# Patient Record
Sex: FEMALE | Race: WHITE | NOT HISPANIC OR LATINO
[De-identification: names, ages, dates, MRNs, and addresses within clinical notes are randomized per-mention and may not be internally consistent; named-entity substitution may affect disease eponyms.]

---

## 2017-04-25 ENCOUNTER — TRANSCRIPTION ENCOUNTER (OUTPATIENT)
Age: 57
End: 2017-04-25

## 2019-02-04 ENCOUNTER — APPOINTMENT (OUTPATIENT)
Dept: ORTHOPEDIC SURGERY | Facility: CLINIC | Age: 59
End: 2019-02-04
Payer: MEDICARE

## 2019-02-04 VITALS
SYSTOLIC BLOOD PRESSURE: 107 MMHG | DIASTOLIC BLOOD PRESSURE: 65 MMHG | BODY MASS INDEX: 23.32 KG/M2 | TEMPERATURE: 98.5 F | WEIGHT: 140 LBS | HEART RATE: 80 BPM | HEIGHT: 65 IN

## 2019-02-04 DIAGNOSIS — Z80.3 FAMILY HISTORY OF MALIGNANT NEOPLASM OF BREAST: ICD-10-CM

## 2019-02-04 DIAGNOSIS — Z80.8 FAMILY HISTORY OF MALIGNANT NEOPLASM OF OTHER ORGANS OR SYSTEMS: ICD-10-CM

## 2019-02-04 DIAGNOSIS — M17.12 UNILATERAL PRIMARY OSTEOARTHRITIS, LEFT KNEE: ICD-10-CM

## 2019-02-04 PROCEDURE — 99204 OFFICE O/P NEW MOD 45 MIN: CPT

## 2019-02-04 PROCEDURE — 73562 X-RAY EXAM OF KNEE 3: CPT | Mod: LT

## 2019-02-04 NOTE — REASON FOR VISIT
[Initial Visit] : an initial visit for [Knee Pain] : knee pain [Spouse] : spouse [FreeTextEntry2] : Left knee pain

## 2019-02-04 NOTE — HISTORY OF PRESENT ILLNESS
[Pain Location] : pain [Worsening] : worsening [2] : a current pain level of 2/10 [___ yrs] : [unfilled] year(s) ago [Intermit.] : ~He/She~ states the symptoms seem to be intermittent [Bending] : worsened by bending [Walking] : worsened by walking [Heat] : relieved by heat [Recumbency] : relieved by recumbency [Rest] : relieved by rest [de-identified] : 59 year old female presents with left knee pain since she was 20 years old. She was referred by Dr. Whittington and tried injections with him. The pain is a dull, cramping pain. Rest and heat compresses alleviate the pain. Walking and bending aggravate the pain. She exercises 2 times a week with treadmill. When she takes the stairs, she favors her left knee. She notes dislocation of patella when she extends her knee. She can no longer extend her knee actiely, but her extensor mechanism is intact

## 2019-02-04 NOTE — ADDENDUM
[FreeTextEntry1] : I, Katerina Rivera, acted solely as a scribe for Dr. Herb Xavier on this date 02/04/2019.

## 2019-02-04 NOTE — DISCUSSION/SUMMARY
[de-identified] : 59 year old female presents with bone on bone patellofemoral compartment osteoarthritis, severe lateral subluxation of patella, and dysplastic trochlea of the left knee. We discussed the nature of the condition and the treatment options. Based on the x-ray and physical exam, the patient is a candidate for a left patellofemoral replacement or left total knee replacement. The patient will schedule a left total knee replacement today. The patient will follow up after the procedure.\par \par A knee replacement means resurfacing of all 3 surfaces of the patella, the femur, and tibia with metal and plastic parts. The prosthetic parts are usually cemented into position and well outpatient range of motion from full extension to about 120° of flexion. The postoperative motion, however, is determined by multiple factors, the most important of which is preoperative motion. In general, the better motion preoperatively, the better the motion postoperatively. The operation, pending medical clearance, gently requires hospitalization of 3-4 days for one knee, 5-7 days for bilateral knee replacements. In general, we prefer to perform the procedure under spinal anesthesia with femoral nerve block and occasional single shot sciatic nerve block. We may ask a patient to give 2 units of blood for bilateral total knee arthroplasties, for one knee we institute a normogram which may include administration of preoperative Procrit. The operative procedure takes probably 1-2 hours. The operation requires a straight incision anywhere from 5-7 inches down from the knee. Postoperatively, the patient is positioned in a continuous passive motion machine within the first 24 hours and is walking the day after surgery. The first couple days are very painful and the pain medication will alleviate, but not eliminate the pain. The patient must really push hard to get range of motion. Our goal for having a person go home as that the range of motion is approximately 0-90° of flexion and that they can walk with a walker or cane. A walking aid is to be dispensed once the patient is secure enough. In general there, there is no cast or brace required with routine knee replacement. In the long term, we do not encourage our patients to run for the sake of running, although pending their preoperative status, we often allow patient to play doubles tennis or comparative activities. We also allowed them to do gentle intermediate downhill skiing if they are truly an expert skier. Biking is encouraged as well swimming. The followup periods are usually 3 weeks, 6 weeks, 3 months, and yearly intervals. Potential complications with total knee replacement included anesthetic complications and death, infection around 1%, nerve damage, by which means peroneal nerve palsy, footdrop or flapping foot with ambulation. This is particularly more apt to occur in the patient with a valgus or knock-knee deformity. The incidence can be quite high in this particular patient population. There will be areas of skin numbness, but this is not an untoward effect nor do we consider it a complication. Other potential complications include dislocation of the patella component, usually less than 2%; loosening of the tibial or femoral component is much more infrequent. Most often this occurs with infection or long-term use. Patient of extreme risk including markedly overweight patient's may be more prone to prosthetic wear. Major blood vessel damage is also extremely rare. Directly because of the anatomic proximity of the popliteal artery this could be lacerated with subsequent repair required. Be it unlikely, disruption of the popliteal artery could theoretically result in amputation. Similarly, infection could theoretically result in amputation if one were to grow out of an organism that cannot be controlled with antibiotics. General medical complications include phlebitis, for which we would prophylactically anticoagulate patients, but could still occur, and fatal pulmonary embolus which has been reported. Cardiovascular problems, such as heart attack or ischemia are always a concern with such hemodynamic changes in the blood vascular system. Other General complications are rare, but anything medicine could theoretically happen. I think the patient understands the risk benefit ratio of total knee replacement and will think about whether there like to pursue with an operation or nonoperative treatment program.

## 2019-02-04 NOTE — PHYSICAL EXAM
[LE] : Sensory: Intact in bilateral lower extremities [ALL] : dorsalis pedis, posterior tibial, femoral, popliteal, and radial 2+ and symmetric bilaterally [Normal] : Oriented to person, place, and time, insight and judgement were intact and the affect was normal [Poor Appearance] : well-appearing [Acute Distress] : not in acute distress [Obese] : not obese [de-identified] : GENERAL APPEARANCE: Well nourished and hydrated, pleasant, alert, and oriented x 3. Appears their stated age. \par HEENT: Normocephalic, extraocular eye motion intact. Nasal septum midline. Oral cavity clear. External auditory canal clear. \par RESPIRATORY: Breath sounds clear and audible in all lobes. No wheezing, No accessory muscle use.\par CARDIOVASCULAR: No apparent abnormalities. No lower leg edema. No varicosities. Pedal pulses are palpable.\par NEUROLOGIC: Sensation is normal, no muscle weakness in the upper or lower extremities.\par DERMATOLOGIC: No apparent skin lesions, moist, warm, no rash.\par SPINE: Cervical spine appears normal and moves freely; thoracic spine appears normal and moves freely; lumbosacral spine appears normal and moves freely, normal, nontender.\par MUSCULOSKELETAL: Hands, wrists, and elbows are normal and move freely, shoulders are normal and move freely. [de-identified] : Left knee examination demonstrates range of motion preserved, subluxation of patella with full extension [de-identified] : 3V x-ray of the left knee done in office today and reviewed by Dr. Herb Xavier demonstrates bone on bone patellofemoral os severe lateral subluxation of patella and dysplastic trochlea.\par

## 2019-02-20 ENCOUNTER — OTHER (OUTPATIENT)
Age: 59
End: 2019-02-20

## 2019-02-20 RX ORDER — NAPROXEN 500 MG/1
500 TABLET ORAL
Qty: 28 | Refills: 0 | Status: ACTIVE | COMMUNITY
Start: 2019-02-20 | End: 1900-01-01

## 2019-05-01 ENCOUNTER — OUTPATIENT (OUTPATIENT)
Dept: OUTPATIENT SERVICES | Facility: HOSPITAL | Age: 59
LOS: 1 days | End: 2019-05-01
Payer: MEDICARE

## 2019-05-01 VITALS
RESPIRATION RATE: 16 BRPM | HEART RATE: 69 BPM | TEMPERATURE: 97 F | SYSTOLIC BLOOD PRESSURE: 124 MMHG | WEIGHT: 150.13 LBS | HEIGHT: 65 IN | DIASTOLIC BLOOD PRESSURE: 76 MMHG

## 2019-05-01 DIAGNOSIS — Z01.818 ENCOUNTER FOR OTHER PREPROCEDURAL EXAMINATION: ICD-10-CM

## 2019-05-01 DIAGNOSIS — Z90.49 ACQUIRED ABSENCE OF OTHER SPECIFIED PARTS OF DIGESTIVE TRACT: Chronic | ICD-10-CM

## 2019-05-01 DIAGNOSIS — R94.31 ABNORMAL ELECTROCARDIOGRAM [ECG] [EKG]: ICD-10-CM

## 2019-05-01 DIAGNOSIS — Z98.890 OTHER SPECIFIED POSTPROCEDURAL STATES: Chronic | ICD-10-CM

## 2019-05-01 DIAGNOSIS — M19.90 UNSPECIFIED OSTEOARTHRITIS, UNSPECIFIED SITE: ICD-10-CM

## 2019-05-01 DIAGNOSIS — Z29.9 ENCOUNTER FOR PROPHYLACTIC MEASURES, UNSPECIFIED: ICD-10-CM

## 2019-05-01 DIAGNOSIS — Z90.711 ACQUIRED ABSENCE OF UTERUS WITH REMAINING CERVICAL STUMP: Chronic | ICD-10-CM

## 2019-05-01 DIAGNOSIS — M17.12 UNILATERAL PRIMARY OSTEOARTHRITIS, LEFT KNEE: ICD-10-CM

## 2019-05-01 LAB
ANION GAP SERPL CALC-SCNC: 12 MMOL/L — SIGNIFICANT CHANGE UP (ref 5–17)
APTT BLD: 34.3 SEC — SIGNIFICANT CHANGE UP (ref 27.5–36.3)
BASOPHILS # BLD AUTO: 0 K/UL — SIGNIFICANT CHANGE UP (ref 0–0.2)
BASOPHILS NFR BLD AUTO: 0.5 % — SIGNIFICANT CHANGE UP (ref 0–2)
BLD GP AB SCN SERPL QL: SIGNIFICANT CHANGE UP
BUN SERPL-MCNC: 15 MG/DL — SIGNIFICANT CHANGE UP (ref 8–20)
CALCIUM SERPL-MCNC: 9.2 MG/DL — SIGNIFICANT CHANGE UP (ref 8.6–10.2)
CHLORIDE SERPL-SCNC: 103 MMOL/L — SIGNIFICANT CHANGE UP (ref 98–107)
CO2 SERPL-SCNC: 23 MMOL/L — SIGNIFICANT CHANGE UP (ref 22–29)
CREAT SERPL-MCNC: 0.49 MG/DL — LOW (ref 0.5–1.3)
EOSINOPHIL # BLD AUTO: 0.4 K/UL — SIGNIFICANT CHANGE UP (ref 0–0.5)
EOSINOPHIL NFR BLD AUTO: 6.1 % — HIGH (ref 0–6)
GLUCOSE SERPL-MCNC: 97 MG/DL — SIGNIFICANT CHANGE UP (ref 70–115)
HBA1C BLD-MCNC: 5.1 % — SIGNIFICANT CHANGE UP (ref 4–5.6)
HCT VFR BLD CALC: 36.7 % — LOW (ref 37–47)
HGB BLD-MCNC: 12.1 G/DL — SIGNIFICANT CHANGE UP (ref 12–16)
INR BLD: 0.97 RATIO — SIGNIFICANT CHANGE UP (ref 0.88–1.16)
LYMPHOCYTES # BLD AUTO: 1.8 K/UL — SIGNIFICANT CHANGE UP (ref 1–4.8)
LYMPHOCYTES # BLD AUTO: 29.7 % — SIGNIFICANT CHANGE UP (ref 20–55)
MCHC RBC-ENTMCNC: 28.7 PG — SIGNIFICANT CHANGE UP (ref 27–31)
MCHC RBC-ENTMCNC: 33 G/DL — SIGNIFICANT CHANGE UP (ref 32–36)
MCV RBC AUTO: 87.2 FL — SIGNIFICANT CHANGE UP (ref 81–99)
MONOCYTES # BLD AUTO: 0.4 K/UL — SIGNIFICANT CHANGE UP (ref 0–0.8)
MONOCYTES NFR BLD AUTO: 7.1 % — SIGNIFICANT CHANGE UP (ref 3–10)
MRSA PCR RESULT.: SIGNIFICANT CHANGE UP
NEUTROPHILS # BLD AUTO: 3.3 K/UL — SIGNIFICANT CHANGE UP (ref 1.8–8)
NEUTROPHILS NFR BLD AUTO: 56.1 % — SIGNIFICANT CHANGE UP (ref 37–73)
PLATELET # BLD AUTO: 251 K/UL — SIGNIFICANT CHANGE UP (ref 150–400)
POTASSIUM SERPL-MCNC: 4.1 MMOL/L — SIGNIFICANT CHANGE UP (ref 3.5–5.3)
POTASSIUM SERPL-SCNC: 4.1 MMOL/L — SIGNIFICANT CHANGE UP (ref 3.5–5.3)
PROTHROM AB SERPL-ACNC: 11.2 SEC — SIGNIFICANT CHANGE UP (ref 10–12.9)
RBC # BLD: 4.21 M/UL — LOW (ref 4.4–5.2)
RBC # FLD: 13.1 % — SIGNIFICANT CHANGE UP (ref 11–15.6)
S AUREUS DNA NOSE QL NAA+PROBE: SIGNIFICANT CHANGE UP
SODIUM SERPL-SCNC: 138 MMOL/L — SIGNIFICANT CHANGE UP (ref 135–145)
TYPE + AB SCN PNL BLD: SIGNIFICANT CHANGE UP
WBC # BLD: 5.9 K/UL — SIGNIFICANT CHANGE UP (ref 4.8–10.8)
WBC # FLD AUTO: 5.9 K/UL — SIGNIFICANT CHANGE UP (ref 4.8–10.8)

## 2019-05-01 PROCEDURE — 36415 COLL VENOUS BLD VENIPUNCTURE: CPT

## 2019-05-01 PROCEDURE — 93010 ELECTROCARDIOGRAM REPORT: CPT

## 2019-05-01 PROCEDURE — 93005 ELECTROCARDIOGRAM TRACING: CPT

## 2019-05-01 PROCEDURE — 85027 COMPLETE CBC AUTOMATED: CPT

## 2019-05-01 PROCEDURE — 85730 THROMBOPLASTIN TIME PARTIAL: CPT

## 2019-05-01 PROCEDURE — 87640 STAPH A DNA AMP PROBE: CPT

## 2019-05-01 PROCEDURE — 86850 RBC ANTIBODY SCREEN: CPT

## 2019-05-01 PROCEDURE — G0463: CPT

## 2019-05-01 PROCEDURE — 83036 HEMOGLOBIN GLYCOSYLATED A1C: CPT

## 2019-05-01 PROCEDURE — 86900 BLOOD TYPING SEROLOGIC ABO: CPT

## 2019-05-01 PROCEDURE — 87641 MR-STAPH DNA AMP PROBE: CPT

## 2019-05-01 PROCEDURE — 85610 PROTHROMBIN TIME: CPT

## 2019-05-01 PROCEDURE — 80048 BASIC METABOLIC PNL TOTAL CA: CPT

## 2019-05-01 PROCEDURE — 86901 BLOOD TYPING SEROLOGIC RH(D): CPT

## 2019-05-01 RX ORDER — SODIUM CHLORIDE 9 MG/ML
3 INJECTION INTRAMUSCULAR; INTRAVENOUS; SUBCUTANEOUS EVERY 8 HOURS
Refills: 0 | Status: DISCONTINUED | OUTPATIENT
Start: 2019-05-21 | End: 2019-05-23

## 2019-05-01 NOTE — H&P PST ADULT - NSICDXPASTMEDICALHX_GEN_ALL_CORE_FT
PAST MEDICAL HISTORY:  Anemia iron def    Chronic knee pain left  since 20 yr old injured    DDD (degenerative disc disease), cervical bulge disc neck    Heart murmur

## 2019-05-01 NOTE — H&P PST ADULT - NSICDXFAMILYHX_GEN_ALL_CORE_FT
FAMILY HISTORY:  Family history of bone cancer, father  FH: breast cancer, mother  FH: diabetes mellitus, mother

## 2019-05-01 NOTE — H&P PST ADULT - HISTORY OF PRESENT ILLNESS
59 year old female presents with c/o left knee pain since 20 yr old s/p fracture . Over past years pain is worse, uses cane at times. Takes Naproxen as needed with little relief . Pt had gel injection X  1with short term relief . Bone on bone as per pt ,knee gives out with walking at times, activity limited . presents for left knee replacement with Dr. Xavier.

## 2019-05-01 NOTE — H&P PST ADULT - ASSESSMENT
Pleasant 59 yr old female in NAD presents with c/o left knee pain .Pt scheduled for left knee replacement .  CAPRINI VTE 2.0 SCORE [CLOT updated 2019]    AGE RELATED RISK FACTORS                                                       MOBILITY RELATED FACTORS  [X ] Age 41-60 years                                            (1 Point)                    [ ] Bed rest                                                        (1 Point)  [ ] Age: 61-74 years                                           (2 Points)                  [ ] Plaster cast                                                   (2 Points)  [ ] Age= 75 years                                              (3 Points)                    [ ] Bed bound for more than 72 hours                 (2 Points)    DISEASE RELATED RISK FACTORS                                               GENDER SPECIFIC FACTORS  [ ] Edema in the lower extremities                       (1 Point)              [ ] Pregnancy                                                     (1 Point)  [ ] Varicose veins                                               (1 Point)                     [ ] Post-partum < 6 weeks                                   (1 Point)             [ ] BMI > 25 Kg/m2                                            (1 Point)                     [ ] Hormonal therapy  or oral contraception          (1 Point)                 [ ] Sepsis (in the previous month)                        (1 Point)               [ ] History of pregnancy complications                 (1 point)  [ ] Pneumonia or serious lung disease                                               [ ] Unexplained or recurrent                     (1 Point)           (in the previous month)                               (1 Point)  [ ] Abnormal pulmonary function test                     (1 Point)                 SURGERY RELATED RISK FACTORS  [ ] Acute myocardial infarction                              (1 Point)               [ ]  Section                                             (1 Point)  [ ] Congestive heart failure (in the previous month)  (1 Point)      [ ] Minor surgery                                                  (1 Point)   [ ] Inflammatory bowel disease                             (1 Point)               [ ] Arthroscopic surgery                                        (2 Points)  [ ] Central venous access                                      (2 Points)                [ ] General surgery lasting more than 45 minutes (2 points)  [ ] Malignancy- Present or previous                   (2 Points)                [X ] Elective arthroplasty                                         (5 points)    [ ] Stroke (in the previous month)                          (5 Points)                                                                                                                                                           HEMATOLOGY RELATED FACTORS                                                 TRAUMA RELATED RISK FACTORS  [ ] Prior episodes of VTE                                     (3 Points)                [ ] Fracture of the hip, pelvis, or leg                       (5 Points)  [ ] Positive family history for VTE                         (3 Points)             [ ] Acute spinal cord injury (in the previous month)  (5 Points)  [ ] Prothrombin 33600 A                                     (3 Points)               [ ] Paralysis  (less than 1 month)                             (5 Points)  [ ] Factor V Leiden                                             (3 Points)                  [ ] Multiple Trauma within 1 month                        (5 Points)  [ ] Lupus anticoagulants                                     (3 Points)                                                           [ ] Anticardiolipin antibodies                               (3 Points)                                                       [ ] High homocysteine in the blood                      (3 Points)                                             [ ] Other congenital or acquired thrombophilia      (3 Points)                                                [ ] Heparin induced thrombocytopenia                  (3 Points)                                     Total Score [      6    ]  OPIOID RISK TOOL    FAMILIA EACH BOX THAT APPLIES AND ADD TOTALS AT THE END    FAMILY HISTORY OF SUBSTANCE ABUSE                 FEMALE         MALE                                                Alcohol                             [  ]1 pt          [  ]3pts                                               Illegal Durgs                     [  ]2 pts        [  ]3pts                                               Rx Drugs                           [  ]4 pts        [  ]4 pts    PERSONAL HISTORY OF SUBSTANCE ABUSE                                                                                          Alcohol                             [  ]3 pts       [  ]3 pts                                               Illegal Durgs                     [  ]4 pts        [  ]4 pts                                               Rx Drugs                           [  ]5 pts        [  ]5 pts    AGE BETWEEN 16-45 YEARS                                      [  ]1 pt         [  ]1 pt    HISTORY OF PREADOLESCENT   SEXUAL ABUSE                                                             [  ]3 pts        [  ]0pts    PSYCHOLOGICAL DISEASE                     ADD, OCD, Bipolar, Schizophrenia        [  ]2 pts         [  ]2 pts                      Depression                                               [  ]1 pt           [  ]1 pt           SCORING TOTAL   (add numbers and type here)              (*0**)                                     A score of 3 or lower indicated LOW risk for future opiod abuse  A score of 4 to 7 indicated moderate risk for future opiod abuse  A score of 8 or higher indicates a high risk for opiod abuse

## 2019-05-01 NOTE — H&P PST ADULT - LAST ECHOCARDIOGRAM
[facility] heart group [place] [facility] [place] [address]

## 2019-05-01 NOTE — H&P PST ADULT - EKG AND INTERPRETATION
Gastroesophageal Reflux Disease (GERD) in Infants     Hold the baby upright for a time after feeding to help prevent spitting up.   GERD stands for gastroesophageal reflux disease. You may also hear it called acid indigestion or heartburn. It happens when food from the stomach flows back up (refluxes) into the tube that connects the mouth to the stomach (esophagus). Regurgitating or spitting up is common in infants. This is called gastroesophageal reflux or LYLY. In fact, more than half of babies have LYLY during their first 3 months. Babies with LYLY will often spit up after being fed. They may sometime spit up when coughing or crying. They may also be fussy during or after feeding. Babies often grow out of LYLY when they are about 12 to 18 months old. But if LYLY does not go away as your baby grows, or if damage occurs to the esophagus, such as inflammation or narrowing, the baby may have GERD.   Is GERD a problem for my baby?  If a baby is happy and gaining weight normally, the regurgitation is probably LYLY and is likely not causing harm. But certain symptoms can be signs of GERD, a more serious problem. Tell your healthcare provider if your baby has any of the following symptoms:  · Blood, or green or yellow fluid in vomit  · Poor weight gain or growth  · Continues to refuse to eat  · Trouble eating or swallowing  · Breathing problems such as wheezing, persistent cough, or trouble breathing  · Waking up at night coughing or wheezing  How can I help my child feel better?   Your baby will likely outgrow LYLY. To help reduce LYLY and spitting up in the meantime, the following changes can help:  · Feed your baby smaller meals more often. Dont feed your baby again if he or she spits up. Wait until the next mealtime.  · Feed your baby in an upright position.  · Burp your baby gently after each breast, or after 1 to 2 ounces of a bottle.  · Keep your baby in a seated or upright position for at least 30 minutes after  meals.  · For bottle-fed babies, ask your doctor about thickening the breastmilk or formula.  · Avoid tight waistbands and diapers.  · Keep tobacco smoke away from your baby.  It is not known if these measures can prevent LYLY from progressing to GERD, but they are helpful for both conditions.  When should my child see the doctor?   If your child has more serious symptoms of GERD, your baby's doctor or nurse will work with you to help relieve them. Your healthcare provider may suggest some changes in addition to the ones above. These may include raising the head of the crib or trying different formula. Medicines are sometimes prescribed. In certain cases, your baby may need tests to help be sure of the cause of the symptoms.  Date Last Reviewed: 8/1/2016 © 2000-2017 [employer]. [address] [place] [address], [place] [address]. All rights reserved. This information is not intended as a substitute for professional medical care. Always follow your healthcare professional's instructions.              For spitting:  Add 1 tsp of rice cereal per oz of formula (example: for a 2 oz bottle please add 2 tsp of rice cereal) to every bottle.  If this does not help you can increase it to 1 tbsp of rice cereal per oz of formula.          NSR  62   LEFT ANT FASCICULAR BLOCK   NON SPEC T WAVE ABNORMALITY

## 2019-05-01 NOTE — H&P PST ADULT - NSICDXPROBLEM_GEN_ALL_CORE_FT
PROBLEM DIAGNOSES  Problem: Abnormal EKG  Assessment and Plan: medical clearance     Problem: OA (osteoarthritis)  Assessment and Plan: left total knee replacement    Problem: Need for prophylactic measure  Assessment and Plan: caprini score 6

## 2019-05-02 PROBLEM — M25.569 PAIN IN UNSPECIFIED KNEE: Chronic | Status: ACTIVE | Noted: 2019-05-01

## 2019-05-02 PROBLEM — D64.9 ANEMIA, UNSPECIFIED: Chronic | Status: ACTIVE | Noted: 2019-05-01

## 2019-05-02 PROBLEM — M50.30 OTHER CERVICAL DISC DEGENERATION, UNSPECIFIED CERVICAL REGION: Chronic | Status: ACTIVE | Noted: 2019-05-01

## 2019-05-20 ENCOUNTER — TRANSCRIPTION ENCOUNTER (OUTPATIENT)
Age: 59
End: 2019-05-20

## 2019-05-21 ENCOUNTER — APPOINTMENT (OUTPATIENT)
Age: 59
End: 2019-05-21

## 2019-05-21 ENCOUNTER — TRANSCRIPTION ENCOUNTER (OUTPATIENT)
Age: 59
End: 2019-05-21

## 2019-05-21 ENCOUNTER — INPATIENT (INPATIENT)
Facility: HOSPITAL | Age: 59
LOS: 1 days | Discharge: HOME | DRG: 470 | End: 2019-05-23
Attending: ORTHOPAEDIC SURGERY | Admitting: ORTHOPAEDIC SURGERY
Payer: MEDICARE

## 2019-05-21 VITALS
TEMPERATURE: 97 F | RESPIRATION RATE: 16 BRPM | OXYGEN SATURATION: 97 % | HEART RATE: 64 BPM | WEIGHT: 150.13 LBS | DIASTOLIC BLOOD PRESSURE: 71 MMHG | HEIGHT: 65 IN | SYSTOLIC BLOOD PRESSURE: 115 MMHG

## 2019-05-21 DIAGNOSIS — Z00.00 ENCOUNTER FOR GENERAL ADULT MEDICAL EXAMINATION WITHOUT ABNORMAL FINDINGS: ICD-10-CM

## 2019-05-21 DIAGNOSIS — Z87.39 PERSONAL HISTORY OF OTHER DISEASES OF THE MUSCULOSKELETAL SYSTEM AND CONNECTIVE TISSUE: ICD-10-CM

## 2019-05-21 DIAGNOSIS — Z90.49 ACQUIRED ABSENCE OF OTHER SPECIFIED PARTS OF DIGESTIVE TRACT: Chronic | ICD-10-CM

## 2019-05-21 DIAGNOSIS — M17.12 UNILATERAL PRIMARY OSTEOARTHRITIS, LEFT KNEE: ICD-10-CM

## 2019-05-21 DIAGNOSIS — Z90.711 ACQUIRED ABSENCE OF UTERUS WITH REMAINING CERVICAL STUMP: Chronic | ICD-10-CM

## 2019-05-21 DIAGNOSIS — Z96.652 PRESENCE OF LEFT ARTIFICIAL KNEE JOINT: ICD-10-CM

## 2019-05-21 DIAGNOSIS — Z98.890 OTHER SPECIFIED POSTPROCEDURAL STATES: Chronic | ICD-10-CM

## 2019-05-21 LAB
BLD GP AB SCN SERPL QL: SIGNIFICANT CHANGE UP
GLUCOSE BLDC GLUCOMTR-MCNC: 76 MG/DL — SIGNIFICANT CHANGE UP (ref 70–99)
GLUCOSE BLDC GLUCOMTR-MCNC: 85 MG/DL — SIGNIFICANT CHANGE UP (ref 70–99)
GLUCOSE BLDC GLUCOMTR-MCNC: 89 MG/DL — SIGNIFICANT CHANGE UP (ref 70–99)
TYPE + AB SCN PNL BLD: SIGNIFICANT CHANGE UP

## 2019-05-21 PROCEDURE — 20985 CPTR-ASST DIR MS PX: CPT | Mod: AS

## 2019-05-21 PROCEDURE — 99222 1ST HOSP IP/OBS MODERATE 55: CPT

## 2019-05-21 PROCEDURE — 27447 TOTAL KNEE ARTHROPLASTY: CPT | Mod: RT

## 2019-05-21 PROCEDURE — 20985 CPTR-ASST DIR MS PX: CPT

## 2019-05-21 PROCEDURE — 73560 X-RAY EXAM OF KNEE 1 OR 2: CPT | Mod: 26,LT

## 2019-05-21 PROCEDURE — 27447 TOTAL KNEE ARTHROPLASTY: CPT | Mod: AS,RT

## 2019-05-21 RX ORDER — VANCOMYCIN HCL 1 G
1000 VIAL (EA) INTRAVENOUS
Refills: 0 | Status: COMPLETED | OUTPATIENT
Start: 2019-05-21 | End: 2019-05-21

## 2019-05-21 RX ORDER — DOCUSATE SODIUM 100 MG
100 CAPSULE ORAL THREE TIMES A DAY
Refills: 0 | Status: DISCONTINUED | OUTPATIENT
Start: 2019-05-21 | End: 2019-05-23

## 2019-05-21 RX ORDER — ACETAMINOPHEN 500 MG
975 TABLET ORAL ONCE
Refills: 0 | Status: COMPLETED | OUTPATIENT
Start: 2019-05-21 | End: 2019-05-21

## 2019-05-21 RX ORDER — OXYCODONE HYDROCHLORIDE 5 MG/1
10 TABLET ORAL EVERY 4 HOURS
Refills: 0 | Status: DISCONTINUED | OUTPATIENT
Start: 2019-05-21 | End: 2019-05-23

## 2019-05-21 RX ORDER — ONDANSETRON 8 MG/1
4 TABLET, FILM COATED ORAL ONCE
Refills: 0 | Status: COMPLETED | OUTPATIENT
Start: 2019-05-21 | End: 2019-05-21

## 2019-05-21 RX ORDER — FENTANYL CITRATE 50 UG/ML
50 INJECTION INTRAVENOUS
Refills: 0 | Status: DISCONTINUED | OUTPATIENT
Start: 2019-05-21 | End: 2019-05-21

## 2019-05-21 RX ORDER — OXYCODONE HYDROCHLORIDE 5 MG/1
20 TABLET ORAL ONCE
Refills: 0 | Status: DISCONTINUED | OUTPATIENT
Start: 2019-05-21 | End: 2019-05-21

## 2019-05-21 RX ORDER — CEFAZOLIN SODIUM 1 G
2000 VIAL (EA) INJECTION
Refills: 0 | Status: COMPLETED | OUTPATIENT
Start: 2019-05-21 | End: 2019-05-21

## 2019-05-21 RX ORDER — ALENDRONATE SODIUM 70 MG/1
1 TABLET ORAL
Qty: 0 | Refills: 0 | DISCHARGE

## 2019-05-21 RX ORDER — TRANEXAMIC ACID 100 MG/ML
700 INJECTION, SOLUTION INTRAVENOUS ONCE
Refills: 0 | Status: DISCONTINUED | OUTPATIENT
Start: 2019-05-21 | End: 2019-05-21

## 2019-05-21 RX ORDER — MAGNESIUM HYDROXIDE 400 MG/1
30 TABLET, CHEWABLE ORAL DAILY
Refills: 0 | Status: DISCONTINUED | OUTPATIENT
Start: 2019-05-21 | End: 2019-05-23

## 2019-05-21 RX ORDER — ACETAMINOPHEN 500 MG
975 TABLET ORAL EVERY 8 HOURS
Refills: 0 | Status: DISCONTINUED | OUTPATIENT
Start: 2019-05-21 | End: 2019-05-23

## 2019-05-21 RX ORDER — SODIUM CHLORIDE 9 MG/ML
1000 INJECTION, SOLUTION INTRAVENOUS
Refills: 0 | Status: DISCONTINUED | OUTPATIENT
Start: 2019-05-21 | End: 2019-05-21

## 2019-05-21 RX ORDER — KETOROLAC TROMETHAMINE 30 MG/ML
15 SYRINGE (ML) INJECTION EVERY 6 HOURS
Refills: 0 | Status: DISCONTINUED | OUTPATIENT
Start: 2019-05-21 | End: 2019-05-21

## 2019-05-21 RX ORDER — ONDANSETRON 8 MG/1
4 TABLET, FILM COATED ORAL EVERY 6 HOURS
Refills: 0 | Status: DISCONTINUED | OUTPATIENT
Start: 2019-05-21 | End: 2019-05-23

## 2019-05-21 RX ORDER — OXYCODONE HYDROCHLORIDE 5 MG/1
10 TABLET ORAL EVERY 12 HOURS
Refills: 0 | Status: DISCONTINUED | OUTPATIENT
Start: 2019-05-21 | End: 2019-05-23

## 2019-05-21 RX ORDER — LORATADINE 10 MG/1
1 TABLET ORAL
Qty: 0 | Refills: 0 | DISCHARGE

## 2019-05-21 RX ORDER — SODIUM CHLORIDE 9 MG/ML
1000 INJECTION, SOLUTION INTRAVENOUS
Refills: 0 | Status: DISCONTINUED | OUTPATIENT
Start: 2019-05-21 | End: 2019-05-22

## 2019-05-21 RX ORDER — ACETAMINOPHEN 500 MG
650 TABLET ORAL EVERY 6 HOURS
Refills: 0 | Status: DISCONTINUED | OUTPATIENT
Start: 2019-05-21 | End: 2019-05-23

## 2019-05-21 RX ORDER — OXYCODONE HYDROCHLORIDE 5 MG/1
5 TABLET ORAL EVERY 4 HOURS
Refills: 0 | Status: DISCONTINUED | OUTPATIENT
Start: 2019-05-21 | End: 2019-05-23

## 2019-05-21 RX ORDER — ASPIRIN/CALCIUM CARB/MAGNESIUM 324 MG
325 TABLET ORAL
Refills: 0 | Status: DISCONTINUED | OUTPATIENT
Start: 2019-05-22 | End: 2019-05-23

## 2019-05-21 RX ORDER — HYDROMORPHONE HYDROCHLORIDE 2 MG/ML
0.5 INJECTION INTRAMUSCULAR; INTRAVENOUS; SUBCUTANEOUS EVERY 4 HOURS
Refills: 0 | Status: DISCONTINUED | OUTPATIENT
Start: 2019-05-21 | End: 2019-05-23

## 2019-05-21 RX ORDER — SENNA PLUS 8.6 MG/1
2 TABLET ORAL AT BEDTIME
Refills: 0 | Status: DISCONTINUED | OUTPATIENT
Start: 2019-05-21 | End: 2019-05-23

## 2019-05-21 RX ORDER — GABAPENTIN 400 MG/1
600 CAPSULE ORAL ONCE
Refills: 0 | Status: COMPLETED | OUTPATIENT
Start: 2019-05-21 | End: 2019-05-21

## 2019-05-21 RX ORDER — CELECOXIB 200 MG/1
400 CAPSULE ORAL ONCE
Refills: 0 | Status: COMPLETED | OUTPATIENT
Start: 2019-05-21 | End: 2019-05-21

## 2019-05-21 RX ORDER — VANCOMYCIN HCL 1 G
1000 VIAL (EA) INTRAVENOUS ONCE
Refills: 0 | Status: COMPLETED | OUTPATIENT
Start: 2019-05-21 | End: 2019-05-21

## 2019-05-21 RX ORDER — FERROUS SULFATE 325(65) MG
325 TABLET ORAL DAILY
Refills: 0 | Status: DISCONTINUED | OUTPATIENT
Start: 2019-05-21 | End: 2019-05-23

## 2019-05-21 RX ORDER — CEFAZOLIN SODIUM 1 G
2000 VIAL (EA) INJECTION ONCE
Refills: 0 | Status: DISCONTINUED | OUTPATIENT
Start: 2019-05-21 | End: 2019-05-21

## 2019-05-21 RX ADMIN — OXYCODONE HYDROCHLORIDE 10 MILLIGRAM(S): 5 TABLET ORAL at 17:12

## 2019-05-21 RX ADMIN — Medication 250 MILLIGRAM(S): at 18:45

## 2019-05-21 RX ADMIN — CELECOXIB 400 MILLIGRAM(S): 200 CAPSULE ORAL at 07:42

## 2019-05-21 RX ADMIN — ONDANSETRON 4 MILLIGRAM(S): 8 TABLET, FILM COATED ORAL at 11:38

## 2019-05-21 RX ADMIN — Medication 15 MILLIGRAM(S): at 23:18

## 2019-05-21 RX ADMIN — OXYCODONE HYDROCHLORIDE 10 MILLIGRAM(S): 5 TABLET ORAL at 18:12

## 2019-05-21 RX ADMIN — Medication 100 MILLIGRAM(S): at 23:18

## 2019-05-21 RX ADMIN — OXYCODONE HYDROCHLORIDE 20 MILLIGRAM(S): 5 TABLET ORAL at 07:42

## 2019-05-21 RX ADMIN — Medication 975 MILLIGRAM(S): at 21:38

## 2019-05-21 RX ADMIN — SODIUM CHLORIDE 3 MILLILITER(S): 9 INJECTION INTRAMUSCULAR; INTRAVENOUS; SUBCUTANEOUS at 14:35

## 2019-05-21 RX ADMIN — Medication 975 MILLIGRAM(S): at 15:34

## 2019-05-21 RX ADMIN — Medication 100 MILLIGRAM(S): at 14:35

## 2019-05-21 RX ADMIN — Medication 100 MILLIGRAM(S): at 17:12

## 2019-05-21 RX ADMIN — GABAPENTIN 600 MILLIGRAM(S): 400 CAPSULE ORAL at 07:43

## 2019-05-21 RX ADMIN — SODIUM CHLORIDE 3 MILLILITER(S): 9 INJECTION INTRAMUSCULAR; INTRAVENOUS; SUBCUTANEOUS at 21:36

## 2019-05-21 RX ADMIN — SENNA PLUS 2 TABLET(S): 8.6 TABLET ORAL at 21:38

## 2019-05-21 RX ADMIN — Medication 15 MILLIGRAM(S): at 17:28

## 2019-05-21 RX ADMIN — Medication 975 MILLIGRAM(S): at 14:34

## 2019-05-21 RX ADMIN — OXYCODONE HYDROCHLORIDE 10 MILLIGRAM(S): 5 TABLET ORAL at 15:34

## 2019-05-21 RX ADMIN — OXYCODONE HYDROCHLORIDE 10 MILLIGRAM(S): 5 TABLET ORAL at 14:35

## 2019-05-21 RX ADMIN — Medication 100 MILLIGRAM(S): at 21:38

## 2019-05-21 RX ADMIN — Medication 15 MILLIGRAM(S): at 17:12

## 2019-05-21 RX ADMIN — Medication 975 MILLIGRAM(S): at 07:42

## 2019-05-21 RX ADMIN — Medication 975 MILLIGRAM(S): at 21:39

## 2019-05-21 RX ADMIN — Medication 250 MILLIGRAM(S): at 07:42

## 2019-05-21 NOTE — PHYSICAL THERAPY INITIAL EVALUATION ADULT - ADDITIONAL COMMENTS
Pt lives in a 2nd floor Condo with her spouse. 13+3 steps to enter with handrails, no steps inside. Pt was independent PTA without assist device. Pt owns raised toilet seat, shower chair and SAC.

## 2019-05-21 NOTE — DISCHARGE NOTE PROVIDER - CARE PROVIDER_API CALL
Herb Xavier)  Orthopaedic Surgery  [address]  [place] [address]  Phone: [phone]  Fax: [phone]  Follow Up Time:

## 2019-05-21 NOTE — PHYSICAL THERAPY INITIAL EVALUATION ADULT - RANGE OF MOTION EXAMINATION, REHAB EVAL
Left knee lacking approx 5-10 degrees extension to approx 75-80 degrees flexion (in sitting)/bilateral upper extremity ROM was WFL (within functional limits)/Right LE ROM was WFL (within functional limits)

## 2019-05-21 NOTE — CONSULT NOTE ADULT - PROBLEM SELECTOR RECOMMENDATION 2
PT/OT/pain mgmt  DVT prophylaxis- as per ortho  Abx as per SCIP  Incentive spirometry  Prophylaxis of opioid  induced constipation.

## 2019-05-21 NOTE — CONSULT NOTE ADULT - PROBLEM SELECTOR RECOMMENDATION 4
DVT prophylaxis  - as per ortho protocol- ASA BID   Opioid induced constipation  prophylaxis - bowel regimen

## 2019-05-21 NOTE — PHYSICAL THERAPY INITIAL EVALUATION ADULT - CRITERIA FOR SKILLED THERAPEUTIC INTERVENTIONS
impairments found/Home with RW, Home PT/anticipated equipment needs at discharge/anticipated discharge recommendation

## 2019-05-21 NOTE — DISCHARGE NOTE PROVIDER - NSDCFUADDINST_GEN_ALL_CORE_FT
The patient will be seen in the office in 3 weeks for wound check. Sutures/Staples/Tape will be removed at that time. Patient may shower after post-op day #3 (5/24/19). The dressing is to be removed on post op day #9 (5/30/19). THE DRESSING BECOMES SOILED BEFORE THE REMOVAL DATE, CHANGE WITH A SIMILAR DRESSING. IF THE DRESSING BECOMES STAINED WITH DISCHARGE, CONTACT THE OFFICE FOR FURTHER DIRECTIONS. The patient will contact the office if the wound becomes red, has increasing pain, develops bleeding or discharge, an injury occurs, or has other concerns. The patient will continue PT consistent with total knee replacement. The patient will continue aspirin 325mg twice daily for 6 weeks for blood clot prevention. The patient will take OXYCODONE AND TYLENOL for pain control and titrate according to prescription and patient needs. The patient will take Senna-S while taking oxycodone to prevent narcotic associated constipation.  Additionally, increase water intake (drink at least 8 glasses of water daily) and try adding fiber to the diet by eating fruits, vegetables and foods that are rich in grains. If constipation is experienced, contact the medical/primary care provider to discuss further treatment options. The patient is FULL weight bearing. Elevation of the lower leg is recommended to reduce swelling. The patient will be seen in the office in 3 weeks for wound check. Tape will be removed at that time. Patient may shower after post-op day #3 (5/24/19). The dressing is to be removed on post op day #9 (5/30/19). THE DRESSING BECOMES SOILED BEFORE THE REMOVAL DATE, CHANGE WITH A SIMILAR DRESSING. IF THE DRESSING BECOMES STAINED WITH DISCHARGE, CONTACT THE OFFICE FOR FURTHER DIRECTIONS. The patient will contact the office if the wound becomes red, has increasing pain, develops bleeding or discharge, an injury occurs, or has other concerns. The patient will continue PT consistent with total knee replacement. The patient will continue aspirin 325mg twice daily for 6 weeks for blood clot prevention. The patient will take OXYCODONE AND TYLENOL for pain control and titrate according to prescription and patient needs. The patient will take Senna-S while taking oxycodone to prevent narcotic associated constipation.  Additionally, increase water intake (drink at least 8 glasses of water daily) and try adding fiber to the diet by eating fruits, vegetables and foods that are rich in grains. If constipation is experienced, contact the medical/primary care provider to discuss further treatment options. The patient is FULL weight bearing. Elevation of the lower leg is recommended to reduce swelling.

## 2019-05-21 NOTE — CONSULT NOTE ADULT - SUBJECTIVE AND OBJECTIVE BOX
PMD : Pratibha   Cardio :     Patient is a 59y old  Female who is s/p L TKA , POD # 0 , seen and examined on PACU , tolerated procedure well .    CC: Chronic L knee pain       HPI:  59 year old female presents with c/o left knee pain since 20 yr old s/p fracture . Over past years pain is worse, uses cane at times. Takes Naproxen as needed with little relief . Pt had gel injection X  1with short term relief . Bone on bone as per pt ,knee gives out with walking at times, activity limited . Now s/p L TKA , POD # 0 .       PAST MEDICAL & SURGICAL HISTORY:  Heart murmur  DDD (degenerative disc disease), cervical: bulge disc neck  Anemia: iron def  OP   Chronic knee pain: left  since 20 yr old injured  History of appendectomy  S/P partial hysterectomy  H/O arthroscopy of knee: left  knee      Social History:  Tobacco - denies  ETOH - denies   Illicit drug abuse - denies    FAMILY HISTORY:  Family history of prostate / bone cancer: father  FH: breast cancer: mother  FH: diabetes mellitus: mother      Allergies    No Known Allergies    Intolerances    HOME MEDICATIONS :     · 	naproxen 500 mg oral tablet: Last Dose Taken:  , 1 tab(s) orally 2 times a day, As Needed  · 	alendronate 70 mg oral tablet: Last Dose Taken:  , 1 tab(s) orally once a week  · 	Allergy Relief 10 mg oral tablet: Last Dose Taken:  , 1 tab(s) orally once a day, As Needed    REVIEW OF SYSTEMS:    CONSTITUTIONAL: No fever, weight loss, or fatigue  EYES: No eye pain, visual disturbances, or discharge  NECK: No pain or stiffness  RESPIRATORY: No cough, wheezing, chills or hemoptysis; No shortness of breath  CARDIOVASCULAR: No chest pain, palpitations, dizziness, or leg swelling  GASTROINTESTINAL: No abdominal or epigastric pain. No nausea, vomiting, or hematemesis; No diarrhea or constipation. No melena or hematochezia.  GENITOURINARY: No dysuria, frequency, hematuria, or incontinence  NEUROLOGICAL: No headaches, memory loss, loss of strength, numbness, or tremors  SKIN: No itching, burning, rashes, or lesions   LYMPH NODES: No enlarged glands  ENDOCRINE: No heat or cold intolerance; No hair loss  MUSCULOSKELETAL: chronic L knee pain   PSYCHIATRIC: No depression, anxiety, mood swings, or difficulty sleeping  HEME/LYMPH: No easy bruising, or bleeding gums  ALLERGY AND IMMUNOLOGIC: No hives or eczema    MEDICATIONS  (STANDING):  ceFAZolin   IVPB 2000 milliGRAM(s) IV Intermittent <User Schedule>  lactated ringers. 1000 milliLiter(s) (125 mL/Hr) IV Continuous <Continuous>  vancomycin  IVPB 1000 milliGRAM(s) IV Intermittent <User Schedule>    MEDICATIONS  (PRN):  fentaNYL    Injectable 50 MICROGram(s) IV Push every 10 minutes PRN Severe Pain (7 - 10)      Vital Signs Last 24 Hrs  T(C): 36.4 (21 May 2019 11:01), Max: 36.4 (21 May 2019 11:01)  T(F): 97.5 (21 May 2019 11:01), Max: 97.5 (21 May 2019 11:01)  HR: 52 (21 May 2019 12:45) (48 - 64)  BP: 91/56 (21 May 2019 12:45) (91/56 - 115/71)  BP(mean): --  RR: 14 (21 May 2019 12:45) (12 - 17)  SpO2: 99% (21 May 2019 12:45) (97% - 99%)    PHYSICAL EXAM:    GENERAL: NAD, well-groomed, well-developed  HEAD:  Atraumatic, Normocephalic  EYES: EOMI, PERRLA, conjunctiva and sclera clear  NECK: Supple, No JVD, Normal thyroid  NERVOUS SYSTEM:  Alert & Oriented X3  CHEST/LUNG: CTA  b/l,  no rales, rhonchi, wheezing, or rubs  HEART: Regular rate and rhythm; No murmurs, rubs, or gallops  ABDOMEN: Soft, Nontender, Nondistended; Bowel sounds present  EXTREMITIES:  2+ Peripheral Pulses, No clubbing, cyanosis, or edema , L knee ACE wrap + , clean and dry   LYMPH: No lymphadenopathy noted  SKIN: No rashes or lesions    LABS: Pending       RADIOLOGY & ADDITIONAL STUDIES:  < from: Xray Knee 1 or 2 Views, Left (05.21.19 @ 11:02) >     EXAM:  KNEE-LEFT                          PROCEDURE DATE:  05/21/2019          INTERPRETATION:  HISTORY: Postoperative  knee replacement.    Two views of the LEFT knee are submitted.    Evaluation demonstrates the presence of a tricompartmental knee   replacement with the femoral, tibial and patellar components in proper   anatomic alignment. There is no fracture .  Impression:  Knee prosthetic components in proper anatomical alignment.                  BOUCHRA GONZALEZ M.D., ATTENDING RADIOLOGIST  This document has been electronically signed. May 21 2019 11:48AM        < end of copied text >

## 2019-05-21 NOTE — DISCHARGE NOTE PROVIDER - HOSPITAL COURSE
The patient underwent a LEFT TOTAL KNEE REPLACEMENT on 5/21/19. The patient received antibiotics consistent with SCIP guidelines. The patient underwent the procedure and had no intra-operative complications. Post-operatively, the patient was seen by medicine and PT. The patient received ASIRIN for DVTP. The patient received pain medications per orthopedic pain management protocol and the pain was appropriately controlled. The patient did not have any post-operative medical complications. The patient was discharged in stable condition.

## 2019-05-21 NOTE — CONSULT NOTE ADULT - ASSESSMENT
59 year old female presents with c/o left knee pain since 20 yr old s/p fracture . Over past years pain is worse, uses cane at times. Takes Naproxen as needed with little relief . Pt had gel injection X  1with short term relief . Bone on bone as per pt ,knee gives out with walking at times, activity limited . Now s/p L TKA , POD # 0 .

## 2019-05-22 ENCOUNTER — TRANSCRIPTION ENCOUNTER (OUTPATIENT)
Age: 59
End: 2019-05-22

## 2019-05-22 LAB
ANION GAP SERPL CALC-SCNC: 8 MMOL/L — SIGNIFICANT CHANGE UP (ref 5–17)
BUN SERPL-MCNC: 8 MG/DL — SIGNIFICANT CHANGE UP (ref 8–20)
CALCIUM SERPL-MCNC: 8.2 MG/DL — LOW (ref 8.6–10.2)
CHLORIDE SERPL-SCNC: 105 MMOL/L — SIGNIFICANT CHANGE UP (ref 98–107)
CO2 SERPL-SCNC: 25 MMOL/L — SIGNIFICANT CHANGE UP (ref 22–29)
CREAT SERPL-MCNC: 0.44 MG/DL — LOW (ref 0.5–1.3)
GLUCOSE SERPL-MCNC: 111 MG/DL — SIGNIFICANT CHANGE UP (ref 70–115)
HCT VFR BLD CALC: 27 % — LOW (ref 37–47)
HGB BLD-MCNC: 8.9 G/DL — LOW (ref 12–16)
MCHC RBC-ENTMCNC: 28.7 PG — SIGNIFICANT CHANGE UP (ref 27–31)
MCHC RBC-ENTMCNC: 33 G/DL — SIGNIFICANT CHANGE UP (ref 32–36)
MCV RBC AUTO: 87.1 FL — SIGNIFICANT CHANGE UP (ref 81–99)
PLATELET # BLD AUTO: 194 K/UL — SIGNIFICANT CHANGE UP (ref 150–400)
POTASSIUM SERPL-MCNC: 3.7 MMOL/L — SIGNIFICANT CHANGE UP (ref 3.5–5.3)
POTASSIUM SERPL-SCNC: 3.7 MMOL/L — SIGNIFICANT CHANGE UP (ref 3.5–5.3)
RBC # BLD: 3.1 M/UL — LOW (ref 4.4–5.2)
RBC # FLD: 13.1 % — SIGNIFICANT CHANGE UP (ref 11–15.6)
SODIUM SERPL-SCNC: 138 MMOL/L — SIGNIFICANT CHANGE UP (ref 135–145)
WBC # BLD: 7.9 K/UL — SIGNIFICANT CHANGE UP (ref 4.8–10.8)
WBC # FLD AUTO: 7.9 K/UL — SIGNIFICANT CHANGE UP (ref 4.8–10.8)

## 2019-05-22 PROCEDURE — 99233 SBSQ HOSP IP/OBS HIGH 50: CPT

## 2019-05-22 RX ORDER — SODIUM CHLORIDE 9 MG/ML
1000 INJECTION, SOLUTION INTRAVENOUS
Refills: 0 | Status: DISCONTINUED | OUTPATIENT
Start: 2019-05-22 | End: 2019-05-23

## 2019-05-22 RX ORDER — ASPIRIN/CALCIUM CARB/MAGNESIUM 324 MG
1 TABLET ORAL
Qty: 84 | Refills: 0
Start: 2019-05-22 | End: 2019-07-02

## 2019-05-22 RX ORDER — SODIUM CHLORIDE 9 MG/ML
1000 INJECTION INTRAMUSCULAR; INTRAVENOUS; SUBCUTANEOUS ONCE
Refills: 0 | Status: COMPLETED | OUTPATIENT
Start: 2019-05-22 | End: 2019-05-22

## 2019-05-22 RX ORDER — OXYCODONE HYDROCHLORIDE 5 MG/1
1 TABLET ORAL
Qty: 40 | Refills: 0
Start: 2019-05-22

## 2019-05-22 RX ORDER — SENNOSIDES/DOCUSATE SODIUM 8.6MG-50MG
2 TABLET ORAL
Qty: 20 | Refills: 0
Start: 2019-05-22 | End: 2019-05-31

## 2019-05-22 RX ADMIN — OXYCODONE HYDROCHLORIDE 10 MILLIGRAM(S): 5 TABLET ORAL at 05:29

## 2019-05-22 RX ADMIN — Medication 100 MILLIGRAM(S): at 21:08

## 2019-05-22 RX ADMIN — Medication 100 MILLIGRAM(S): at 13:32

## 2019-05-22 RX ADMIN — SODIUM CHLORIDE 3 MILLILITER(S): 9 INJECTION INTRAMUSCULAR; INTRAVENOUS; SUBCUTANEOUS at 21:06

## 2019-05-22 RX ADMIN — OXYCODONE HYDROCHLORIDE 10 MILLIGRAM(S): 5 TABLET ORAL at 05:30

## 2019-05-22 RX ADMIN — ONDANSETRON 4 MILLIGRAM(S): 8 TABLET, FILM COATED ORAL at 08:47

## 2019-05-22 RX ADMIN — Medication 975 MILLIGRAM(S): at 13:45

## 2019-05-22 RX ADMIN — Medication 975 MILLIGRAM(S): at 21:09

## 2019-05-22 RX ADMIN — SODIUM CHLORIDE 125 MILLILITER(S): 9 INJECTION, SOLUTION INTRAVENOUS at 11:49

## 2019-05-22 RX ADMIN — Medication 975 MILLIGRAM(S): at 21:08

## 2019-05-22 RX ADMIN — SENNA PLUS 2 TABLET(S): 8.6 TABLET ORAL at 21:08

## 2019-05-22 RX ADMIN — OXYCODONE HYDROCHLORIDE 10 MILLIGRAM(S): 5 TABLET ORAL at 05:32

## 2019-05-22 RX ADMIN — Medication 325 MILLIGRAM(S): at 05:29

## 2019-05-22 RX ADMIN — SODIUM CHLORIDE 3 MILLILITER(S): 9 INJECTION INTRAMUSCULAR; INTRAVENOUS; SUBCUTANEOUS at 05:32

## 2019-05-22 RX ADMIN — Medication 975 MILLIGRAM(S): at 05:29

## 2019-05-22 RX ADMIN — SODIUM CHLORIDE 125 MILLILITER(S): 9 INJECTION, SOLUTION INTRAVENOUS at 03:14

## 2019-05-22 RX ADMIN — OXYCODONE HYDROCHLORIDE 10 MILLIGRAM(S): 5 TABLET ORAL at 06:30

## 2019-05-22 RX ADMIN — Medication 975 MILLIGRAM(S): at 13:32

## 2019-05-22 RX ADMIN — Medication 325 MILLIGRAM(S): at 13:32

## 2019-05-22 RX ADMIN — SODIUM CHLORIDE 3 MILLILITER(S): 9 INJECTION INTRAMUSCULAR; INTRAVENOUS; SUBCUTANEOUS at 13:32

## 2019-05-22 RX ADMIN — SODIUM CHLORIDE 125 MILLILITER(S): 9 INJECTION, SOLUTION INTRAVENOUS at 20:28

## 2019-05-22 RX ADMIN — SODIUM CHLORIDE 1000 MILLILITER(S): 9 INJECTION INTRAMUSCULAR; INTRAVENOUS; SUBCUTANEOUS at 09:30

## 2019-05-22 RX ADMIN — Medication 975 MILLIGRAM(S): at 05:32

## 2019-05-22 RX ADMIN — Medication 100 MILLIGRAM(S): at 05:29

## 2019-05-22 RX ADMIN — Medication 325 MILLIGRAM(S): at 18:16

## 2019-05-22 NOTE — DISCHARGE NOTE NURSING/CASE MANAGEMENT/SOCIAL WORK - NSDCDPATPORTLINK_GEN_ALL_CORE
You can access the [facility] Portal, offered by [facility], by registering with the following website: [email or web address]

## 2019-05-22 NOTE — PROGRESS NOTE ADULT - ASSESSMENT
59 year old female presents with c/o left knee pain since 20 yr old s/p fracture . Over past years pain is worse, uses cane at times. Takes Naproxen as needed with little relief . Pt had gel injection X  1with short term relief . Bone on bone as per pt ,knee gives out with walking at times, activity limited . Now s/p L TKA , POD # 1 . S/P episode of vomiting / light handedness - now resolved       Problem/Recommendation - 1:  Problem: Primary osteoarthritis of left knee. Recommendation: S/P L TKA , POD # 1.     Problem/Recommendation - 2:  ·  Problem: Status post left knee replacement.  Recommendation: PT/OT/pain mgmt  DVT prophylaxis- as per ortho  Abx as per SCIP- given   Incentive spirometry  Prophylaxis of opioid  induced constipation.      Problem/Recommendation - 3:  ·  Problem: History of osteoporosis.  Recommendation: - takes Alendronate   , may continue.     Problem / Plan - 4: Presyncopal episode - likely due to postop orthostatic hypotension , NS bolus ordered , patient placed back in the bed . Will observe closely .    Problem / Plan - 5: Hx of iron deficiency anemia , H/H drop noted , likely Acute blood lost anemia - secondary to surgical blood lost on chronic anemia , will repeat CBC in am , transfuse if Hg < 7 or patient symptomatic     Problem / Plan - 6: postop nausea / vomiting - likely due to narcotics , continue Zofran , observe      Problem/Recommendation - 4:  ·  Problem: Need for prophylactic measure.  Recommendation: DVT prophylaxis  - as per ortho protocol- ASA BID   Opioid induced constipation  prophylaxis - bowel regimen. 59 year old female presents with c/o left knee pain since 20 yr old s/p fracture . Over past years pain is worse, uses cane at times. Takes Naproxen as needed with little relief . Pt had gel injection X  1with short term relief . Bone on bone as per pt ,knee gives out with walking at times, activity limited . Now s/p L TKA , POD # 1 . S/P episode of vomiting / light handedness - now resolved       Problem/Recommendation - 1:  Problem: Primary osteoarthritis of left knee. Recommendation: S/P L TKA , POD # 1.     Problem/Recommendation - 2:  ·  Problem: Status post left knee replacement.  Recommendation: PT/OT/pain mgmt  DVT prophylaxis- as per ortho  Abx as per SCIP- given   Incentive spirometry  Prophylaxis of opioid  induced constipation.      Problem/Recommendation - 3:  ·  Problem: History of osteoporosis.  Recommendation: - takes Alendronate   , may continue.     Problem / Plan - 4: Presyncopal episode - likely due to postop orthostatic hypotension , NS bolus ordered , patient placed back in the bed . Will observe closely .    Problem / Plan - 5: Hx of iron deficiency anemia , H/H drop noted , likely Acute blood lost anemia - secondary to surgical blood lost on chronic anemia , will repeat CBC in am , transfuse if Hg < 7 or patient symptomatic     Problem / Plan - 6: postop nausea / vomiting - likely due to narcotics , continue Zofran , observe      Problem/Recommendation - 4:  ·  Problem: Need for prophylactic measure.  Recommendation: DVT prophylaxis  - as per ortho protocol- ASA BID   Opioid induced constipation  prophylaxis - bowel regimen.     d/c plan - Home , likely in am if stable   Dr Fletcher will take over in am .

## 2019-05-23 VITALS
RESPIRATION RATE: 18 BRPM | SYSTOLIC BLOOD PRESSURE: 112 MMHG | DIASTOLIC BLOOD PRESSURE: 71 MMHG | OXYGEN SATURATION: 96 % | TEMPERATURE: 98 F | HEART RATE: 87 BPM

## 2019-05-23 LAB
ANION GAP SERPL CALC-SCNC: 8 MMOL/L — SIGNIFICANT CHANGE UP (ref 5–17)
BUN SERPL-MCNC: 5 MG/DL — LOW (ref 8–20)
CALCIUM SERPL-MCNC: 8.5 MG/DL — LOW (ref 8.6–10.2)
CHLORIDE SERPL-SCNC: 106 MMOL/L — SIGNIFICANT CHANGE UP (ref 98–107)
CO2 SERPL-SCNC: 25 MMOL/L — SIGNIFICANT CHANGE UP (ref 22–29)
CREAT SERPL-MCNC: 0.45 MG/DL — LOW (ref 0.5–1.3)
GLUCOSE SERPL-MCNC: 108 MG/DL — SIGNIFICANT CHANGE UP (ref 70–115)
HCT VFR BLD CALC: 26.2 % — LOW (ref 37–47)
HGB BLD-MCNC: 8.7 G/DL — LOW (ref 12–16)
MCHC RBC-ENTMCNC: 28.8 PG — SIGNIFICANT CHANGE UP (ref 27–31)
MCHC RBC-ENTMCNC: 33.2 G/DL — SIGNIFICANT CHANGE UP (ref 32–36)
MCV RBC AUTO: 86.8 FL — SIGNIFICANT CHANGE UP (ref 81–99)
PLATELET # BLD AUTO: 211 K/UL — SIGNIFICANT CHANGE UP (ref 150–400)
POTASSIUM SERPL-MCNC: 3.4 MMOL/L — LOW (ref 3.5–5.3)
POTASSIUM SERPL-SCNC: 3.4 MMOL/L — LOW (ref 3.5–5.3)
RBC # BLD: 3.02 M/UL — LOW (ref 4.4–5.2)
RBC # FLD: 13.4 % — SIGNIFICANT CHANGE UP (ref 11–15.6)
SODIUM SERPL-SCNC: 139 MMOL/L — SIGNIFICANT CHANGE UP (ref 135–145)
WBC # BLD: 7.2 K/UL — SIGNIFICANT CHANGE UP (ref 4.8–10.8)
WBC # FLD AUTO: 7.2 K/UL — SIGNIFICANT CHANGE UP (ref 4.8–10.8)

## 2019-05-23 PROCEDURE — 85027 COMPLETE CBC AUTOMATED: CPT

## 2019-05-23 PROCEDURE — 82962 GLUCOSE BLOOD TEST: CPT

## 2019-05-23 PROCEDURE — C1713: CPT

## 2019-05-23 PROCEDURE — 73560 X-RAY EXAM OF KNEE 1 OR 2: CPT

## 2019-05-23 PROCEDURE — 86901 BLOOD TYPING SEROLOGIC RH(D): CPT

## 2019-05-23 PROCEDURE — 97163 PT EVAL HIGH COMPLEX 45 MIN: CPT

## 2019-05-23 PROCEDURE — C1776: CPT

## 2019-05-23 PROCEDURE — 97110 THERAPEUTIC EXERCISES: CPT

## 2019-05-23 PROCEDURE — 80048 BASIC METABOLIC PNL TOTAL CA: CPT

## 2019-05-23 PROCEDURE — 86850 RBC ANTIBODY SCREEN: CPT

## 2019-05-23 PROCEDURE — 97116 GAIT TRAINING THERAPY: CPT

## 2019-05-23 PROCEDURE — 36415 COLL VENOUS BLD VENIPUNCTURE: CPT

## 2019-05-23 PROCEDURE — 99232 SBSQ HOSP IP/OBS MODERATE 35: CPT

## 2019-05-23 PROCEDURE — 86900 BLOOD TYPING SEROLOGIC ABO: CPT

## 2019-05-23 RX ORDER — FERROUS SULFATE 325(65) MG
1 TABLET ORAL
Qty: 60 | Refills: 0
Start: 2019-05-23 | End: 2019-06-21

## 2019-05-23 RX ORDER — POTASSIUM CHLORIDE 20 MEQ
40 PACKET (EA) ORAL ONCE
Refills: 0 | Status: COMPLETED | OUTPATIENT
Start: 2019-05-23 | End: 2019-05-23

## 2019-05-23 RX ORDER — ASCORBIC ACID 60 MG
1 TABLET,CHEWABLE ORAL
Qty: 0 | Refills: 0 | DISCHARGE

## 2019-05-23 RX ADMIN — Medication 975 MILLIGRAM(S): at 06:04

## 2019-05-23 RX ADMIN — Medication 40 MILLIEQUIVALENT(S): at 11:38

## 2019-05-23 RX ADMIN — SODIUM CHLORIDE 3 MILLILITER(S): 9 INJECTION INTRAMUSCULAR; INTRAVENOUS; SUBCUTANEOUS at 06:04

## 2019-05-23 RX ADMIN — Medication 325 MILLIGRAM(S): at 06:04

## 2019-05-23 RX ADMIN — Medication 325 MILLIGRAM(S): at 11:38

## 2019-05-23 RX ADMIN — Medication 100 MILLIGRAM(S): at 06:04

## 2019-05-23 RX ADMIN — SODIUM CHLORIDE 125 MILLILITER(S): 9 INJECTION, SOLUTION INTRAVENOUS at 03:29

## 2019-05-23 RX ADMIN — Medication 975 MILLIGRAM(S): at 06:05

## 2019-05-23 RX ADMIN — SODIUM CHLORIDE 3 MILLILITER(S): 9 INJECTION INTRAMUSCULAR; INTRAVENOUS; SUBCUTANEOUS at 11:34

## 2019-05-23 NOTE — PROGRESS NOTE ADULT - SUBJECTIVE AND OBJECTIVE BOX
Patient seen and examined . s/p L TKA , POD # 0 . Early this am after sitting up from laying position c/o episode of lightheadedness , became pale and diaphoretic as per nurse , found to be hypotensive . Feels nauseated , vomited .  Started on iv bolus , placed back in the bed , doing better . No chest pain , no sob , no other complaints .     CC : R knee pain well controlled , nauseated / vomited , episode of lightheadedness - now resolved     HPI:  59 year old female presents with c/o left knee pain since 20 yr old s/p fracture . Over past years pain is worse, uses cane at times. Takes Naproxen as needed with little relief . Pt had gel injection X  1with short term relief . Bone on bone as per pt ,knee gives out with walking at times, activity limited . presents for left knee replacement with Dr. Xavier. (01 May 2019 08:19)      PAST MEDICAL & SURGICAL HISTORY:  Heart murmur  DDD (degenerative disc disease), cervical: bulge disc neck  Anemia: iron def  Chronic knee pain: left  since 20 yr old injured  History of appendectomy  S/P partial hysterectomy  H/O arthroscopy of knee: left  knee      MEDICATIONS  (STANDING):  acetaminophen   Tablet .. 975 milliGRAM(s) Oral every 8 hours  aspirin enteric coated 325 milliGRAM(s) Oral two times a day  docusate sodium 100 milliGRAM(s) Oral three times a day  ferrous    sulfate 325 milliGRAM(s) Oral daily  lactated ringers. 1000 milliLiter(s) (125 mL/Hr) IV Continuous <Continuous>  oxyCODONE  ER Tablet 10 milliGRAM(s) Oral every 12 hours  senna 2 Tablet(s) Oral at bedtime  sodium chloride 0.9% Bolus 1000 milliLiter(s) IV Bolus once  sodium chloride 0.9% lock flush 3 milliLiter(s) IV Push every 8 hours    MEDICATIONS  (PRN):  acetaminophen   Tablet .. 650 milliGRAM(s) Oral every 6 hours PRN Temp greater or equal to 38C (100.4F)  aluminum hydroxide/magnesium hydroxide/simethicone Suspension 30 milliLiter(s) Oral four times a day PRN Indigestion  HYDROmorphone  Injectable 0.5 milliGRAM(s) IV Push every 4 hours PRN Severe Pain (7 - 10)/breakthrough pain  magnesium hydroxide Suspension 30 milliLiter(s) Oral daily PRN Constipation  ondansetron Injectable 4 milliGRAM(s) IV Push every 6 hours PRN Nausea and/or Vomiting  oxyCODONE    IR 5 milliGRAM(s) Oral every 4 hours PRN Mild Pain (1 - 3)  oxyCODONE    IR 10 milliGRAM(s) Oral every 4 hours PRN Moderate Pain (4 - 6)      LABS:                          8.9    7.9   )-----------( 194      ( 22 May 2019 08:06 )             27.0     05-22    138  |  105  |  8.0  ----------------------------<  111  3.7   |  25.0  |  0.44<L>    Ca    8.2<L>      22 May 2019 08:06      RADIOLOGY & ADDITIONAL TESTS:    < from: Xray Knee 1 or 2 Views, Left (05.21.19 @ 11:02) >     EXAM:  KNEE-LEFT                          PROCEDURE DATE:  05/21/2019          INTERPRETATION:  HISTORY: Postoperative  knee replacement.    Two views of the LEFT knee are submitted.    Evaluation demonstrates the presence of a tricompartmental knee   replacement with the femoral, tibial and patellar components in proper   anatomic alignment. There is no fracture .  Impression:  Knee prosthetic components in proper anatomical alignment.        BOUCHRA GONZALEZ M.D., ATTENDING RADIOLOGIST  This document has been electronically signed. May 21 2019 11:48AM              < end of copied text >        REVIEW OF SYSTEMS:    As above all other systems reviewed and are negative .     Vital Signs Last 24 Hrs  T(C): 36.6 (22 May 2019 08:55), Max: 36.9 (21 May 2019 21:18)  T(F): 97.8 (22 May 2019 08:55), Max: 98.4 (21 May 2019 21:18)  HR: 70 (22 May 2019 08:55) (48 - 75)  BP: 107/62 (22 May 2019 08:55) (91/56 - 112/67)  BP(mean): --  RR: 18 (22 May 2019 08:55) (12 - 18)  SpO2: 96% (22 May 2019 08:55) (96% - 99%)    PHYSICAL EXAM:    GENERAL: NAD, well-groomed, well-developed  HEAD:  Atraumatic, Normocephalic  EYES: EOMI, PERRLA, conjunctiva and sclera clear  NECK: Supple, No JVD, Normal thyroid  NERVOUS SYSTEM:  Alert & Oriented X3, no focal deficit  CHEST/LUNG: CTA b/l ,  no  rales, rhonchi, wheezing, or rubs  HEART: Regular rate and rhythm; No murmurs, rubs, or gallops  ABDOMEN: Soft, Nontender, Nondistended; Bowel sounds present  EXTREMITIES:  2+ Peripheral Pulses, No clubbing, cyanosis, or edema , L knee ACE wrap + , clean and dry   LYMPH: No lymphadenopathy noted  SKIN: No rashes or lesions
59y Female s/p   under   on Left TKR under Spinal on 5/21    T(C): 36.8 (05-23-19 @ 08:00), Max: 37.1 (05-22-19 @ 23:17)  HR: 87 (05-23-19 @ 08:00) (72 - 94)  BP: 112/71 (05-23-19 @ 08:00) (99/64 - 113/71)  RR: 18 (05-23-19 @ 08:00) (18 - 18)  SpO2: 96% (05-23-19 @ 08:00) (96% - 98%)  Wt(kg): --    Pt seen, doing well, no anesthesia complications or complaints noted or reported.   No Nausea  Pain well controlled
KRISTY MODI  [id number]    History: 59y Female is status post left total knee arthroplasty on 5/21/2019, POD # 1. Patient is doing well and is comfortable. The patient's pain is controlled using the prescribed pain medications. The patient is participating in physical therapy. Denies nausea, vomiting, chest pain, shortness of breath, abdominal pain or fever. No new complaints.      Vital Signs Last 24 Hrs  T(C): 36.9 (22 May 2019 05:26), Max: 36.9 (21 May 2019 21:18)  T(F): 98.4 (22 May 2019 05:26), Max: 98.4 (21 May 2019 21:18)  HR: 75 (22 May 2019 05:26) (48 - 75)  BP: 107/65 (22 May 2019 05:26) (91/56 - 112/67)  BP(mean): --  RR: 18 (22 May 2019 05:26) (12 - 18)  SpO2: 97% (22 May 2019 05:26) (96% - 99%)      Physical exam: The left knee dressing is clean, dry and intact. No drainage or discharge.  The calf is supple nontender.  No calf tenderness. Sensation to light touch is grossly intact distally. Motor function distally is 5/5. Extensor hallucis longus and flexor hallucis longus are intact. No foot drop. 2+ dorsalis pedis pulse. Capillary refill is less than 2 seconds. No cyanosis.    Primary Orthopedic Assessment:  •	s/p LEFT total knee replacement pod #1  	    Plan:   •	DVT prophylaxis aspirin 325mg bid, including use of compression devices and ankle pumps  •	Continue physical therapy  •	Weightbearing as tolerated of the left lower extremity with assistance of a walker  •	Incentive spirometry encouraged  •	Pain control as clinically indicated  •	Discharge planning – anticipated discharge is Home tomorrow
KRISTY MODI  [id number]    History: 59y Female is status post left total knee arthroplasty on 5/21/2019, POD #2. Patient is doing well and is comfortable.   Pt was nauseaous/vomiting yesterday, feeling better now able to keep breakfast down today.  The patient's pain is controlled using the prescribed pain medications. The patient is participating in physical therapy. Denies vomiting, chest pain, shortness of breath, abdominal pain or fever.     Vital Signs Last 24 Hrs  T(C): 37.1 (22 May 2019 23:17), Max: 37.1 (22 May 2019 23:17)  T(F): 98.8 (22 May 2019 23:17), Max: 98.8 (22 May 2019 23:17)  HR: 94 (22 May 2019 23:17) (70 - 94)  BP: 113/71 (22 May 2019 23:17) (99/64 - 113/71)  BP(mean): --  RR: 18 (22 May 2019 23:17) (18 - 18)  SpO2: 97% (22 May 2019 23:17) (96% - 98%)    Physical exam: The left knee dressing is clean, dry and intact.  Dressing removed, no active drainage or discharge. No erythema is noted. No blistering. No ecchymosis.  New dressing placed.   The calf is supple nontender.  No calf tenderness. Sensation to light touch is grossly intact distally. Motor function distally is 5/5. Extensor hallucis longus and flexor hallucis longus are intact. No foot drop. 2+ dorsalis pedis pulse. Capillary refill is less than 2 seconds. No cyanosis.    Primary Orthopedic Assessment:  •	s/p LEFT total knee replacement pod #2	    Plan:   •	DVT prophylaxis aspirin 325mg bid x 6 weeks, including use of compression devices and ankle pumps  •	Continue physical therapy  •	Weightbearing as tolerated of the left lower extremity with assistance of a walker  •	Incentive spirometry encouraged  •	Pain control as clinically indicated  •	Discharge planning – anticipated discharge is Home today when medically cleared
Orthopedic PA Postop Note  Patient S/P LEFT TKA  Patient in bed comfortable   LEFT Leg  Dressing C/D/I - ACE wrap without staining  DP Pulse intact   Calf Soft NT  Dorsi/Plantar Flexion/EHL/FHL intact   Sensation intact to light touch    Vital Signs Last 24 Hrs  T(C): 36.7 (21 May 2019 15:46), Max: 36.7 (21 May 2019 14:18)  T(F): 98.1 (21 May 2019 15:46), Max: 98.1 (21 May 2019 15:46)  HR: 60 (21 May 2019 15:46) (48 - 64)  BP: 108/63 (21 May 2019 15:46) (91/56 - 115/71)  BP(mean): --  RR: 18 (21 May 2019 15:46) (12 - 18)  SpO2: 98% (21 May 2019 15:46) (96% - 99%)    < from: Xray Knee 1 or 2 Views, Left (05.21.19 @ 11:02) >     EXAM:  KNEE-LEFT                          PROCEDURE DATE:  05/21/2019          INTERPRETATION:  HISTORY: Postoperative  knee replacement.    Two views of the LEFT knee are submitted.    Evaluation demonstrates the presence of a tricompartmental knee   replacement with the femoral, tibial and patellar components in proper   anatomic alignment. There is no fracture .  Impression:  Knee prosthetic components in proper anatomical alignment.                  BOUCHRA GONZALEZ M.D., ATTENDING RADIOLOGIST  This document has been electronically signed. May 21 2019 11:48AM                < end of copied text >    A/P: 59F S/P LEFT TKA  1. DVTP - ASA  2. Physical Therapy   3. Pain Control as clinically indicated
Patient seen and examined . s/p L TKA , POD # 1   she is seen earlier , no new complaints   pain controlled , tolerated PT   no overnight events reported       MEDICATIONS  (STANDING):  acetaminophen   Tablet .. 975 milliGRAM(s) Oral every 8 hours  aspirin enteric coated 325 milliGRAM(s) Oral two times a day  docusate sodium 100 milliGRAM(s) Oral three times a day  ferrous    sulfate 325 milliGRAM(s) Oral daily  lactated ringers. 1000 milliLiter(s) (125 mL/Hr) IV Continuous <Continuous>  oxyCODONE  ER Tablet 10 milliGRAM(s) Oral every 12 hours  senna 2 Tablet(s) Oral at bedtime  sodium chloride 0.9% Bolus 1000 milliLiter(s) IV Bolus once  sodium chloride 0.9% lock flush 3 milliLiter(s) IV Push every 8 hours    MEDICATIONS  (PRN):  acetaminophen   Tablet .. 650 milliGRAM(s) Oral every 6 hours PRN Temp greater or equal to 38C (100.4F)  aluminum hydroxide/magnesium hydroxide/simethicone Suspension 30 milliLiter(s) Oral four times a day PRN Indigestion  HYDROmorphone  Injectable 0.5 milliGRAM(s) IV Push every 4 hours PRN Severe Pain (7 - 10)/breakthrough pain  magnesium hydroxide Suspension 30 milliLiter(s) Oral daily PRN Constipation  ondansetron Injectable 4 milliGRAM(s) IV Push every 6 hours PRN Nausea and/or Vomiting  oxyCODONE    IR 5 milliGRAM(s) Oral every 4 hours PRN Mild Pain (1 - 3)  oxyCODONE    IR 10 milliGRAM(s) Oral every 4 hours PRN Moderate Pain (4 - 6)    Vital Signs Last 24 Hrs  T(C): 36.8 (23 May 2019 08:00), Max: 37.1 (22 May 2019 23:17)  T(F): 98.2 (23 May 2019 08:00), Max: 98.8 (22 May 2019 23:17)  HR: 87 (23 May 2019 08:00) (72 - 94)  BP: 112/71 (23 May 2019 08:00) (99/64 - 113/71)  BP(mean): --  RR: 18 (23 May 2019 08:00) (18 - 18)  SpO2: 96% (23 May 2019 08:00) (96% - 98%)  	PHYSICAL EXAM:    GENERAL: NAD, well-groomed, well-developed  NECK: Supple, No JVD, Normal thyroid  CHEST/LUNG: CTA b/l ,  no  rales, rhonchi, wheezing  HEART: Regular rate and rhythm; S1 /S2  No murmurs, rubs  ABDOMEN: Soft, Nontender, Nondistended; Bowel sounds present  EXTREMITIES:  No clubbing, cyanosis, or edema , L knee ACE wrap + , clean and dry                             8.7    7.2   )-----------( 211      ( 23 May 2019 07:17 )             26.2   05-23    139  |  106  |  5.0<L>  ----------------------------<  108  3.4<L>   |  25.0  |  0.45<L>    Ca    8.5<L>      23 May 2019 07:17

## 2019-05-23 NOTE — PROGRESS NOTE ADULT - ASSESSMENT
59 year old female presents with c/o left knee pain since 20 yr old s/p fracture . Over past years pain is worse, uses cane at times. Takes Naproxen as needed with little relief . Pt had gel injection X  1with short term relief . Bone on bone as per pt ,knee gives out with walking at times, activity limited . Now s/p L TKA , POD # 2 . S/P episode of vomiting / light handedness , resolved , post op anemia , hypokalemia 1-   1-Primary osteoarthritis of left knee. Recommendation: S/P L TKA ,    Recommendation: PT/OT/pain mgmt  DVT prophylaxis- as per ortho  Abx as per SCIP- given  Incentive spirometry  Prophylaxis of opioid  induced constipation senna , colace miralax as needed   2- Hypokalemia   replace po K ordered   3- Presyncopal episode - likely due to postop orthostatic hypotension , NS bolus given   no recurrent symptoms   4- Hx of iron deficiency anemia , H/H drop noted , likely Acute blood lost anemia - secondary to surgical blood    CBC result noted stable   no indication for blood tx   cont ferrous sulfate suppelemnt     d/c plan - Home , likely today if tolerates PT

## 2019-05-31 PROBLEM — R01.1 CARDIAC MURMUR, UNSPECIFIED: Chronic | Status: ACTIVE | Noted: 2019-05-01

## 2019-06-08 ENCOUNTER — OUTPATIENT (OUTPATIENT)
Dept: OUTPATIENT SERVICES | Facility: HOSPITAL | Age: 59
LOS: 1 days | End: 2019-06-08
Payer: MEDICARE

## 2019-06-08 DIAGNOSIS — Z98.890 OTHER SPECIFIED POSTPROCEDURAL STATES: Chronic | ICD-10-CM

## 2019-06-08 DIAGNOSIS — Z90.49 ACQUIRED ABSENCE OF OTHER SPECIFIED PARTS OF DIGESTIVE TRACT: Chronic | ICD-10-CM

## 2019-06-08 DIAGNOSIS — Z51.89 ENCOUNTER FOR OTHER SPECIFIED AFTERCARE: ICD-10-CM

## 2019-06-08 DIAGNOSIS — M16.12 UNILATERAL PRIMARY OSTEOARTHRITIS, LEFT HIP: ICD-10-CM

## 2019-06-08 DIAGNOSIS — Z90.711 ACQUIRED ABSENCE OF UTERUS WITH REMAINING CERVICAL STUMP: Chronic | ICD-10-CM

## 2019-06-12 ENCOUNTER — APPOINTMENT (OUTPATIENT)
Dept: ORTHOPEDIC SURGERY | Facility: CLINIC | Age: 59
End: 2019-06-12
Payer: MEDICARE

## 2019-06-12 VITALS
BODY MASS INDEX: 23.32 KG/M2 | DIASTOLIC BLOOD PRESSURE: 69 MMHG | SYSTOLIC BLOOD PRESSURE: 105 MMHG | HEIGHT: 65 IN | HEART RATE: 84 BPM | WEIGHT: 140 LBS

## 2019-06-12 PROCEDURE — 99024 POSTOP FOLLOW-UP VISIT: CPT

## 2019-06-12 PROCEDURE — 73562 X-RAY EXAM OF KNEE 3: CPT | Mod: LT

## 2019-06-12 RX ORDER — PANTOPRAZOLE 40 MG/1
40 TABLET, DELAYED RELEASE ORAL DAILY
Qty: 30 | Refills: 0 | Status: ACTIVE | COMMUNITY
Start: 2019-06-12 | End: 1900-01-01

## 2019-06-12 NOTE — HISTORY OF PRESENT ILLNESS
[___ Weeks Post Op] : [unfilled] weeks post op [1] : the patient reports pain that is 1/10 in severity [Clean/Dry/Intact] : clean, dry and intact [Swelling] : swollen [Xray (Date:___)] : [unfilled] Xray -  [Doing Well] : is doing well [No Sign of Infection] : is showing no signs of infection [Excellent Pain Control] : has excellent pain control [Chills] : no chills [Constipation] : no constipation [Diarrhea] : no diarrhea [Dysuria] : no dysuria [Fever] : no fever [Nausea] : no nausea [Vomiting] : no vomiting [Healed] : not healed [Erythema] : not erythematous [Discharge] : absent of discharge [Dehiscence] : not dehisced [de-identified] : S/P Left TKR computer-assisted tibial resection, OrthAlign procedure, realignment of the left knee patellar extensor mechanism DOS:05/21/19 [de-identified] : 59 year old female presents S/P Left TKR computer-assisted tibial resection, OrthAlign procedure, realignment of te left knee patellar extensor mechanism DOS:05/21/19. She is doing very well about 3 weeks from her procedure. She does note numbness in the lateral aspect of the left knee. She is currently in outpatient PT. Overall she is happy with her surgical result thus far, noting improvements in her ability to ambulate long distances. She is ambulating with a cane.  [de-identified] : Left knee midline incision is well-healing with no signs of infection. painless range of motion 0 degrees of extension to 120 degrees of flexion. Slight lateral swelling.  [de-identified] : 3V xray of the left knee done in office today and reviewed by Dr. Herb Xavier demonstrates s/p TKR with implants in good positioning, no sign of wear, loosening or subsidence.  [de-identified] : 59 year old female S/P Left TKR computer-assisted tibial resection, OrthAlign procedure, realignment of the left knee patellar extensor mechanism DOS:05/21/19. She is doing very well 3 weeks from her procedure, with minimal post-operative pain and well-progressing ROM. She can continue with PT. Xrays were reviewed and the patient was reassured that their TKR components are in good position with no signs of loosening or wear. I discussed the importance of antibiotic use with any dental work. F/U 3-4 weeks for reassessment.

## 2019-06-12 NOTE — ADDENDUM
[FreeTextEntry1] : I, Edgard Vega, acted solely as a scribe for Dr. Herb Xavier on this date 06/12/2019.

## 2019-06-26 ENCOUNTER — APPOINTMENT (OUTPATIENT)
Dept: ORTHOPEDIC SURGERY | Facility: CLINIC | Age: 59
End: 2019-06-26
Payer: MEDICARE

## 2019-06-26 VITALS
SYSTOLIC BLOOD PRESSURE: 107 MMHG | TEMPERATURE: 98 F | WEIGHT: 140 LBS | HEIGHT: 65 IN | BODY MASS INDEX: 23.32 KG/M2 | HEART RATE: 90 BPM | DIASTOLIC BLOOD PRESSURE: 74 MMHG

## 2019-06-26 PROCEDURE — 99024 POSTOP FOLLOW-UP VISIT: CPT

## 2019-06-26 NOTE — ADDENDUM
[FreeTextEntry1] : I, Edgard Vega, acted solely as a scribe for Dr. Herb Xavier on this date 06/26/2019.

## 2019-06-26 NOTE — HISTORY OF PRESENT ILLNESS
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 \par \par  [de-identified] : Patient is 59 year old female who is s/p left TKA from 5/21/19. She is about 1 month from her surgery. Patient developed rash to knee, was seen by urgent care who gave topical cortisone cream. She states rash then spread to abdomen, back, and her right LE. She saw PMD who gave Medrol and Keflex. She states rash is resolving.  She stopped ASA 2 days ago.  [de-identified] : Left knee exam shows well-healed midline incision, with no signs of infection. topical dermatitis extending from the lateral aspect of the left knee to the left hip, back, and abdomen.  [de-identified] : No new imaging reviewed today.  [de-identified] : 59 year old female S/P Left TKR computer-assisted tibial resection, OrthAlign procedure, realignment of the left knee patellar extensor mechanism DOS:05/21/19. She seems to have developed topical dermatitis, and I am not concerned for infection of her left TKA components. She is on oral steroids and abx and she is improving.  She should finish her course of Keflex. F/U as scheduled.\par \par

## 2019-06-28 PROCEDURE — 97140 MANUAL THERAPY 1/> REGIONS: CPT

## 2019-06-28 PROCEDURE — 97110 THERAPEUTIC EXERCISES: CPT

## 2019-06-28 PROCEDURE — 97010 HOT OR COLD PACKS THERAPY: CPT

## 2019-06-28 PROCEDURE — 97162 PT EVAL MOD COMPLEX 30 MIN: CPT

## 2019-07-08 ENCOUNTER — OTHER (OUTPATIENT)
Age: 59
End: 2019-07-08

## 2019-07-09 ENCOUNTER — APPOINTMENT (OUTPATIENT)
Dept: ORTHOPEDIC SURGERY | Facility: CLINIC | Age: 59
End: 2019-07-09
Payer: MEDICARE

## 2019-07-09 PROCEDURE — 73562 X-RAY EXAM OF KNEE 3: CPT | Mod: TC,LT

## 2019-07-09 PROCEDURE — 99024 POSTOP FOLLOW-UP VISIT: CPT

## 2019-08-14 ENCOUNTER — APPOINTMENT (OUTPATIENT)
Dept: ORTHOPEDIC SURGERY | Facility: CLINIC | Age: 59
End: 2019-08-14
Payer: MEDICARE

## 2019-08-14 DIAGNOSIS — Z47.1 AFTERCARE FOLLOWING JOINT REPLACEMENT SURGERY: ICD-10-CM

## 2019-08-14 DIAGNOSIS — Z96.652 PRESENCE OF LEFT ARTIFICIAL KNEE JOINT: ICD-10-CM

## 2019-08-14 DIAGNOSIS — Z96.652 AFTERCARE FOLLOWING JOINT REPLACEMENT SURGERY: ICD-10-CM

## 2019-08-14 PROCEDURE — 99024 POSTOP FOLLOW-UP VISIT: CPT

## 2019-08-14 PROCEDURE — 73562 X-RAY EXAM OF KNEE 3: CPT

## 2019-08-14 NOTE — ADDENDUM
[FreeTextEntry1] : I, Edgard Vega, acted solely as a scribe for Dr. Herb Xavier on this date 08/14/2019.

## 2019-08-14 NOTE — HISTORY OF PRESENT ILLNESS
[___ Months Post Op] : [unfilled] months post op [Xray (Date:___)] : [unfilled] Xray -  [Doing Well] : is doing well [No Sign of Infection] : is showing no signs of infection [2] : the patient reports pain that is 2/10 in severity [Healed] : healed [Swelling] : swollen [Neuro Intact] : an unremarkable neurological exam [Vascular Intact] : ~T peripheral vascular exam normal [Negative Matthew's] : maneuvers demonstrated a negative Matthew's sign [Adequate Pain Control] : has adequate pain control [Chills] : no chills [Constipation] : no constipation [Diarrhea] : no diarrhea [Dysuria] : no dysuria [Fever] : no fever [Nausea] : no nausea [Discharge] : absent of discharge [Vomiting] : no vomiting [Erythema] : not erythematous [Dehiscence] : not dehisced [de-identified] : S/P Left TKR computer-assisted tibial resection, OrthAlign procedure, realignment of the left knee patellar extensor mechanism DOS:05/21/19 [de-identified] : 59 year old female here for evaluation s/p left TKA DOS:05/21/19. Today she notes lateral knee pain. She is in PT 1-2 times per week. She reports that she feels too tired. She does not take medication for pain. .\par Overall pain is worse in the right knee.  [de-identified] : 3V xray of the left knee done in office today and reviewed by Dr. Herb Xavier demonstrates s/p TKR with implants in good positioning, no sign of wear, loosening or subsidence.  [de-identified] : Examination of left knee demonstrate well-healed incision, range of motion 0-110°, pain with terminal extension, no signs of infection.  [de-identified] : 59 year old female s/p left TKA DOS:05/21/19. She does have residual left knee pain, but she has progressed well functionally. She will continue with exercise and PT. Xrays were reviewed and the patient was reassured that their TKA components are in good position with no signs of loosening or wear. I discussed the importance of antibiotic use with any dental work. F/U 3 months for reassessment.

## 2020-01-22 ENCOUNTER — APPOINTMENT (OUTPATIENT)
Dept: ORTHOPEDIC SURGERY | Facility: CLINIC | Age: 60
End: 2020-01-22

## 2020-01-31 ENCOUNTER — APPOINTMENT (OUTPATIENT)
Dept: ORTHOPEDIC SURGERY | Facility: CLINIC | Age: 60
End: 2020-01-31
Payer: MEDICARE

## 2020-01-31 VITALS
BODY MASS INDEX: 24.16 KG/M2 | SYSTOLIC BLOOD PRESSURE: 98 MMHG | HEIGHT: 65 IN | DIASTOLIC BLOOD PRESSURE: 62 MMHG | WEIGHT: 145 LBS | HEART RATE: 79 BPM

## 2020-01-31 PROCEDURE — 99214 OFFICE O/P EST MOD 30 MIN: CPT | Mod: 25

## 2020-01-31 PROCEDURE — 73562 X-RAY EXAM OF KNEE 3: CPT | Mod: RT

## 2020-01-31 PROCEDURE — 20610 DRAIN/INJ JOINT/BURSA W/O US: CPT | Mod: RT

## 2020-01-31 NOTE — PROCEDURE
[de-identified] : I injected the patient's right knee today with cortisone.\par \par I discussed at length with the patient the planned steroid and lidocaine injection. The risks, benefits, convalescence and alternatives were reviewed. The possible side effects discussed included but were not limited to: pain, swelling, heat, bleeding, and redness. Symptoms are generally mild but if they are extensive then contact the office. Giving pain relievers by mouth such as NSAIDs or Tylenol can generally treat the reactions to steroid and lidocaine. Rare cases of infection have been noted. Rash, hives and itching may occur post injection. If you have muscle pain or cramps, flushing and or swelling of the face, rapid heart beat, nausea, dizziness, fever, chills, headache, difficulty breathing, swelling in the arms or legs, or have a prickly feeling of your skin, contact a health care provider immediately. Following this discussion, the knee was prepped with Alcohol and under sterile condition the 80 mg Depo-Medrol and 6 cc Lidocaine injection was performed with a 20 gauge needle through a superolateral injection site. The needle was introduced into the joint, aspiration was performed to ensure intra-articular placement and the medication was injected. Upon withdrawal of the needle the site was cleaned with alcohol and a band aid applied. The patient tolerated the injection well and there were no adverse effects. Post injection instructions included no strenuous activity for 24 hours, cryotherapy and if there are any adverse effects to contact the office. \par \par \par

## 2020-01-31 NOTE — HISTORY OF PRESENT ILLNESS
[Pain Location] : pain [Stable] : stable [___ mths] : [unfilled] month(s) ago [2] : a current pain level of 2/10 [Walking] : worsened by walking [Exercise Regimen] : relieved by exercise regimen [Rest] : relieved by rest [de-identified] : patient is here for followup evaluation s/p left TKA DOS:05/21/19. she is now 8 months from surgery.\par she had lateral numbness and pain with tactile stimulation. She continues to have it but it's much less than before.She is ambulating well without using a cane. She does not take any pain medication. She still does exercise. Today she complains of the right knee pain that has been present for the past few months. [de-identified] : touch

## 2020-01-31 NOTE — ADDENDUM
[FreeTextEntry1] : I, Edgard Vega, acted solely as a scribe for Dr. Herb Xavier on this date 01/31/2020.

## 2020-01-31 NOTE — PHYSICAL EXAM
[LE] : 5/5 motor strength in bilateral lower extremities [ALL] : dorsalis pedis, posterior tibial, femoral, popliteal, and radial 2+ and symmetric bilaterally [Antalgic] : not antalgic [de-identified] : Examination of left knee demonstrate well-healed incision, range of motion 0-120°, pain with terminal extension, no signs of infection. \par right knee examination shows mild medial joint line tenderness, her ROM is 0-120 degrees, she has mild valgus deformity, mild joint effusion, pain with patellar grind. \par  [de-identified] : GENERAL APPEARANCE:   Well nourished and hydrated, pleasant, alert, and oriented x 4.  \par CARDIOVASCULAR:   No apparent abnormalities.  No lower leg edema.  No varicosities.  Pedal pulses are palpable.\par RESPIRATORY: Breathe sound clear and audible in all lobes. No wheezing, No accessory muscle use.\par NEUROLOGIC:   Sensation is normal, no muscle weakness in the upper or lower extremities.\par DERMATOLOGIC:   No apparent skin lesions, moist, warm, no rash.\par SPINE:   Cervical spine appears normal and moves freely; thoracic spine appears normal and moves freely; lumbosacral spine appears normal and moves freely, normal, nontender.\par MUSCULOSKELETAL:   Hands, wrists, and elbows are normal and move freely, shoulders are normal and move freely. \par  [de-identified] : 3V Xray of the right knee done in office today and reviewed by Dr. Herb Xavier demonstrates severe bone-on-bone patellofemoral osteoarthritis of the right knee.

## 2020-02-19 RX ORDER — AMOXICILLIN 500 MG/1
500 TABLET, FILM COATED ORAL
Qty: 8 | Refills: 2 | Status: ACTIVE | COMMUNITY
Start: 2020-02-19 | End: 1900-01-01

## 2020-05-01 ENCOUNTER — APPOINTMENT (OUTPATIENT)
Dept: ORTHOPEDIC SURGERY | Facility: CLINIC | Age: 60
End: 2020-05-01

## 2020-08-24 ENCOUNTER — APPOINTMENT (OUTPATIENT)
Dept: ORTHOPEDIC SURGERY | Facility: CLINIC | Age: 60
End: 2020-08-24
Payer: MEDICARE

## 2020-08-24 VITALS
DIASTOLIC BLOOD PRESSURE: 69 MMHG | BODY MASS INDEX: 24.16 KG/M2 | HEART RATE: 86 BPM | WEIGHT: 145 LBS | TEMPERATURE: 98.1 F | HEIGHT: 65 IN | SYSTOLIC BLOOD PRESSURE: 112 MMHG

## 2020-08-24 DIAGNOSIS — M17.11 UNILATERAL PRIMARY OSTEOARTHRITIS, RIGHT KNEE: ICD-10-CM

## 2020-08-24 PROCEDURE — 99213 OFFICE O/P EST LOW 20 MIN: CPT | Mod: 25

## 2020-08-24 PROCEDURE — 20610 DRAIN/INJ JOINT/BURSA W/O US: CPT

## 2024-04-18 ENCOUNTER — APPOINTMENT (OUTPATIENT)
Dept: OPHTHALMOLOGY | Facility: CLINIC | Age: 64
End: 2024-04-18
Payer: MEDICARE

## 2024-04-18 ENCOUNTER — NON-APPOINTMENT (OUTPATIENT)
Age: 64
End: 2024-04-18

## 2024-04-18 PROCEDURE — 99203 OFFICE O/P NEW LOW 30 MIN: CPT

## 2025-08-05 ENCOUNTER — NON-APPOINTMENT (OUTPATIENT)
Age: 65
End: 2025-08-05

## 2025-08-05 ENCOUNTER — APPOINTMENT (OUTPATIENT)
Dept: OPHTHALMOLOGY | Facility: CLINIC | Age: 65
End: 2025-08-05
Payer: MEDICARE

## 2025-08-05 PROCEDURE — 92014 COMPRE OPH EXAM EST PT 1/>: CPT
